# Patient Record
Sex: FEMALE | Race: WHITE | Employment: UNEMPLOYED | URBAN - METROPOLITAN AREA
[De-identification: names, ages, dates, MRNs, and addresses within clinical notes are randomized per-mention and may not be internally consistent; named-entity substitution may affect disease eponyms.]

---
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
## 2022-02-02 NOTE — PROGRESS NOTES
Behavioral Services      TEAM REVIEW    Date: 4/16/19    The unit team and provider met, reviewed patient's case, problem goals and objectives.    Current Diagnoses:  Alcohol Use Disorder   305.00 (F10.10) Mild    304.30 (F12.20) Cannabis Use Disorder Moderate       Bipolar affective disorder II -- (F31.81) vs  disruptive mood dysregulation disorder/DMDD -- (F34.8).   Cannabis use disorder, moderate (F12.20).   Alcohol use disorder, moderate (F10.20).   Obsessive-compulsive disorder (F42) - by history.     Tourette's disorder by history (F95.2).  Multiple fears versus phobias - of being alone or of being followed by somebody who is out to get her (F40.248), of spiders (F40.228.).   Parent-child relational strain (Z62.820).   Sibling relational strain (Z62.891).   Academic educational problem (Z55.9).       Safety concerns since last review (SI, SIB, HI)  Denied this week       Chemical use since last review:  Relapse on Marijuana   UA Results:    Recent Results (from the past 168 hour(s))   Ethyl Glucuronide Urine    Collection Time: 04/10/19  1:15 PM   Result Value Ref Range    Ethyl Glucuronide Urine Negative      Drug abuse screen 77 urine    Collection Time: 04/10/19  1:15 PM   Result Value Ref Range    Amphetamine Qual Urine Negative NEG^Negative    Barbiturates Qual Urine Negative NEG^Negative    Benzodiazepine Qual Urine Negative NEG^Negative    Cannabinoids Qual Urine Positive (A) NEG^Negative    Cocaine Qual Urine Negative NEG^Negative    Opiates Qualitative Urine Negative NEG^Negative    PCP Qual Urine Negative NEG^Negative   Creatinine random urine    Collection Time: 04/10/19  1:15 PM   Result Value Ref Range    Creatinine Urine Random 90 mg/dL   Ethyl Glucuronide Urine    Collection Time: 04/12/19  1:05 PM   Result Value Ref Range    Ethyl Glucuronide Urine Negative      Drug abuse screen 77 urine    Collection Time: 04/12/19  1:05 PM   Result Value Ref Range    Amphetamine Qual Urine Negative  NEG^Negative    Barbiturates Qual Urine Negative NEG^Negative    Benzodiazepine Qual Urine Negative NEG^Negative    Cannabinoids Qual Urine Positive (A) NEG^Negative    Cocaine Qual Urine Negative NEG^Negative    Opiates Qualitative Urine Negative NEG^Negative    PCP Qual Urine Negative NEG^Negative       Progress toward treatment goal:  Attending treatment and completed initial assignments         Other Therapy Interfering Behaviors:  Breaking stage expectations  Substance use   Not bought in to completing treatment   Stealing       Current medications/changes and medical concerns:  Current Outpatient Medications   Medication     acetylcysteine (N-ACETYL CYSTEINE) 600 MG CAPS capsule     cloNIDine (CATAPRES) 0.1 MG tablet     escitalopram (LEXAPRO) 5 MG tablet     fluticasone (FLONASE) 50 MCG/ACT spray     hydrOXYzine (ATARAX) 25 MG tablet     levonorgestrel (MIRENA) 20 MCG/24HR IUD     loratadine (CLARITIN) 10 MG tablet     lurasidone (LATUDA) 20 MG TABS tablet     metFORMIN (GLUCOPHAGE-XR) 500 MG 24 hr tablet     Current Facility-Administered Medications   Medication     diphenhydrAMINE (BENADRYL) capsule 25 mg     Facility-Administered Medications Ordered in Other Encounters   Medication     acetaminophen (TYLENOL) tablet 650 mg     benzocaine-menthol (CEPACOL) 15-3.6 MG lozenge 1 lozenge     calcium carbonate (TUMS) chewable tablet 1,000 mg     ibuprofen (ADVIL/MOTRIN) tablet 400 mg     Increase in NAC 1200  Hydroxyzine 25mg       Family Involvement -  Mother has been actively involved, however not holding client accountable to stages and buying client's friend's vape and client using that for substances.     Current assignments:  Timeline     Current Stage:  1    Tasks:  Speak with mother to discuss that she needs to decide if client will be continuing in this program or not.     Discharge Planning:  Target Discharge Date/Timeframe:  7-10 weeks    Med Mgmt Provider/Appt:  Dr. Ledbetter at [facility]  therapy Provider/Appt:  Brynn Wayne    Family therapy Provider/Appt:  in home with [place]    [facility] II plan[facility]    School enrollment:  will need referral    Other referrals:  [facility]         Attended by:  Monisha Clarke MD, Trina Ovalle MA, [facility], [facility], Shadia Snow, RN, Nataliia Reeves, [facility], Gil Herzog, [facility], [facility], [facility], Mari Ybarra, MATHEW, [facility], [facility], Omar Christine, [facility], [facility], Lacy Sotelo M.Div., Shanika Boone, [facility], [facility], Adiel Dent, Intern,          No

## 2022-04-28 NOTE — PROGRESS NOTES
4/18/2019 [facility] 2  Group Chart Note - Co-facilitated with Omar Christine MPS, [facility]    Number of clients attending the group:  6      Joselyn Ibarra attended 1 hour Health Education  group covering the following topic of The effects of marijuana on the brain and body. Client was Actively participating, Attentive and Engaged.  Client's response: Client was actively engaged and asked questions appropriate to the topic of discussion.   Detail Level: Detailed Add 12067 Cpt? (Important Note: In 2017 The Use Of 77074 Is Being Tracked By Cms To Determine Future Global Period Reimbursement For Global Periods): yes

## 2022-11-01 ENCOUNTER — TELEPHONE ENCOUNTER (OUTPATIENT)
Dept: URBAN - METROPOLITAN AREA CLINIC 82 | Facility: CLINIC | Age: 22
End: 2022-11-01

## 2023-05-16 NOTE — PROGRESS NOTES
[facility], [place]   Psychiatric Progress Note      Impression:   This is a 18 year old female admitted for SI and s/p suicide attempt.  We are adjusting medications to target mood, impulsivity, trauma symptoms and tics.  We are also working with the patient on therapeutic skill building.  She has not been active on our unit thus far, citing fatigue that may still be from her overdose, though may also be from getting back on her prior medications for the first time in 1.5 weeks.  She is ambivalent about using Haloperidol or Pimozide for her tics at this time.  We are seeking further collateral information from her outpatient team, as well as will need to revise our family and Rule 25 assessments to make formal recommendations for treatment from here.         Diagnoses and Plan:     Principal Diagnosis:   Principal Problem:    Unspecified trauma- and stressor-related disorder (10/9/2018)  Active Problems:    Unspecified disruptive, impulse-control, and conduct disorder (12/3/2012)    Bipolar II disorder (10/9/2018)    Tourette's disorder (10/9/2018)    Obsessive-compulsive disorder (10/9/2018)    Cannabis use disorder, severe (10/9/2018)    Alcohol use disorder, mild (10/9/2018)    Vitamin D insufficiency (10/10/2018)    Tobacco use disorder, moderate (10/12/2018)    Low vitamin B12 level (6/25/2019)    Unit: 6AE  Attending: Marquez  Medications: risks/benefits discussed with guardian/patient  - Continue Escitalopram 5mg PO daily  - Continue Clonidine 0.2mg PO at bedtime  - Again stop Lurasidone and Metformin; did not discontinue orders, though patient did decline them today  - Offered Haloperidol vs. Pimozide to treat tics and mood disorder, though she was ambivalent with concerns for side effects              - If she does want to trial this, would need to repeat EKG to make sure that QTc has normalized  - Consider NRT if requested  Laboratory/Imaging:  - CBC wnl  - LFT's wnl except low  albumin  - TSH wnl  - Fasting glucose wnl  - Fasting lipids wnl  - Ferritin wnl  - Vitamin B12 low (<500), Folate wnl  - Vitamin D low (<30)  Consults:  - Rule 25 assessment revision pending  Patient will be treated in therapeutic milieu with appropriate individual and group therapies as described.  Family Assessment pending    Medical diagnoses to be addressed this admission:   s/p overdose --> lingering fatigue may be from this  - Continue to monitor  - Repeat EKG if starting meds     Obesity/Weight gain  - Will need to consider medications that pose less risk for weight gain     Vitamin D insufficiency  - Look to restart Vitamin D3 2000 units PO daily    Vitamin B12 insufficiency  - Look to start Vitamin B12 500mcg PO daily    Relevant psychosocial stressors: family dynamics, school, medical issues and trauma    Legal Status: Voluntary    Safety Assessment:   Checks: Status 15  Precautions:  Suicide  Self-harm  Assault  Pt has not required locked seclusion or restraints in the past 24 hours to maintain safety, please refer to RN documentation for further details.    The risks, benefits, alternatives and side effects have been discussed and are understood by the patient and other caregivers.     Anticipated Disposition/Discharge Date: 7/1-3  Target symptoms to stabilize: SI, irritable, depressed, neurovegetative symptoms, sleep issues, poor frustration tolerance, substance use, impulsive and hyperarousal  Target disposition: need more information, but could range from return to current outpatient services with increased elements, Dual IOP, or Dual RTC    Attestation:  Patient has been seen and evaluated by me,  Blaze Marquez MD          Interim History:   The patient's care was discussed with the treatment team and chart notes were reviewed.    Side effects to medication: sedation  Sleep: slept through the night  Intake: eating/drinking without difficulty  Groups: refusing groups  Peer interactions: isolative and  "withdrawn    I found Ann Marie asleep in bed prior to lunch. Ann Marie reported feeling \"tired\" still, though not sure why. She has not been going to groups because of her fatigue. She did sleep through the night, though. She denied any other lingering effects from her overdose. She has been eating. She denied having any SI now. She has talked with her mother during a visit yesterday, with her still being mad at her mother for putting her in the hospital. She did look over the information regarding Haloperidol or Pimozide for her tics, with her noting that using them \"seems risky.\" She did not have any decision about trialing those at this time.     The 10 point Review of Systems is negative other than noted in the HPI    Attempted to reach mother by phone, but unable to connect.         Medications:       cloNIDine  0.2 mg Oral Daily     escitalopram  5 mg Oral QPM     lurasidone  20 mg Oral Daily     metFORMIN  1,000 mg Oral Daily with supper             Allergies:   No Known Allergies         Psychiatric Examination:   /56   Pulse 85   Temp 97.6  F (36.4  C) (Oral)   Resp 13   Ht 1.651 m (5' 5\")   Wt 102.5 kg (226 lb)   LMP  (LMP Unknown)   SpO2 97%   BMI 37.61 kg/m    Weight is 226 lbs 0 oz  Body mass index is 37.61 kg/m .    Appearance:  dressed in hospital scrubs, fatigued, obese and unkempt  Attitude:  somewhat cooperative  Eye Contact:  poor   Mood:  \"tired\"  Affect:  mood congruent, intensity is blunted, constricted mobility and restricted range  Speech:  clear, coherent, decreased prosody and mumbling  Psychomotor Behavior:  no evidence of tardive dyskinesia, dystonia, or tics and physical retardation  Thought Process:  linear  Associations:  no loose associations  Thought Content:  no evidence of psychotic thought and denied current SI  Insight:  partial  Judgment:  limited to poor  Oriented to:  time, person, and place  Attention Span and Concentration:  limited to poor  Recent and Remote Memory:  " limited  Language: intact  Fund of Knowledge: appropriate  Muscle Strength and Tone: normal  Gait and Station: not observed         Labs:     Recent Results (from the past 24 hour(s))   CBC with platelets differential    Collection Time: 06/25/19  7:39 AM   Result Value Ref Range    WBC 5.9 4.0 - 11.0 10e9/L    RBC Count 4.33 3.7 - 5.3 10e12/L    Hemoglobin 11.9 11.7 - 15.7 g/dL    Hematocrit 37.7 35.0 - 47.0 %    MCV 87 77 - 100 fl    MCH 27.5 26.5 - 33.0 pg    MCHC 31.6 31.5 - 36.5 g/dL    RDW 14.7 10.0 - 15.0 %    Platelet Count 359 150 - 450 10e9/L    Diff Method Automated Method     % Neutrophils 45.7 %    % Lymphocytes 40.6 %    % Monocytes 9.8 %    % Eosinophils 2.9 %    % Basophils 0.8 %    % Immature Granulocytes 0.2 %    Nucleated RBCs 0 0 /100    Absolute Neutrophil 2.7 1.3 - 7.0 10e9/L    Absolute Lymphocytes 2.4 1.0 - 5.8 10e9/L    Absolute Monocytes 0.6 0.0 - 1.3 10e9/L    Absolute Eosinophils 0.2 0.0 - 0.7 10e9/L    Absolute Basophils 0.1 0.0 - 0.2 10e9/L    Abs Immature Granulocytes 0.0 0 - 0.4 10e9/L    Absolute Nucleated RBC 0.0    Lipid panel reflex to direct LDL    Collection Time: 06/25/19  7:39 AM   Result Value Ref Range    Cholesterol 139 <170 mg/dL    Triglycerides 66 <90 mg/dL    HDL Cholesterol 43 (L) >45 mg/dL    LDL Cholesterol Calculated 83 <110 mg/dL    Non HDL Cholesterol 96 <120 mg/dL   Hepatic panel    Collection Time: 06/25/19  7:39 AM   Result Value Ref Range    Bilirubin Direct <0.1 0.0 - 0.2 mg/dL    Bilirubin Total 0.3 0.2 - 1.3 mg/dL    Albumin 3.0 (L) 3.4 - 5.0 g/dL    Protein Total 7.5 6.8 - 8.8 g/dL    Alkaline Phosphatase 61 40 - 150 U/L    ALT 13 0 - 50 U/L    AST 10 0 - 35 U/L   Glucose    Collection Time: 06/25/19  7:39 AM   Result Value Ref Range    Glucose 77 70 - 99 mg/dL        [FreeTextEntry1] : Patient was seen and evaluated. Continue to closely monitor the wound- healed at this time. Patient will benefit from custom foot orthotics for arch support and to stabilize and assist with gait and to offload the right hallux.   Patient to transition into regular shoes, Discussed with patient to wear supportive shoe gear with wide toed shoes and extra depth in toe box to accommodate the deformity.\par Discussed with patient to perform daily foot checks and monitor for any new lesions, open wounds or calluses and to return to clinic at the earliest.\par Discussed with patient and mother that will order MRI at the next visit to re-asses the OM in the hallux.  \par Spent 20 minutes for patient care and medical decision making.\par

## 2023-06-15 NOTE — PROGRESS NOTES
Patient states that she has high self esteem but it is very difficult for her to believe that others would think of her positively. Denies SI/HI/SIB/AVH. Goal for this hospitalization is to stabilize then discharge quickly.       01/02/20 1300   Behavioral Health   Hallucinations denies / not responding to hallucinations   Thinking intact   Orientation person: oriented;place: oriented;date: oriented;time: oriented   Memory baseline memory   Insight admits / accepts   Judgement intact   Eye Contact at examiner   Affect blunted, flat   Mood depressed   Physical Appearance/Attire appears stated age   Hygiene well groomed   Suicidality other (see comments)  (denies)   1. Wish to be Dead (Recent) No   2. Non-Specific Active Suicidal Thoughts (Recent) No   Self Injury other (see comment)  (denies)   Elopement   (no concern)   Activity isolative   Speech clear;coherent   Medication Sensitivity no stated side effects;no observed side effects   Psychomotor / Gait balanced;steady   Activities of Daily Living   Hygiene/Grooming independent   Oral Hygiene independent   Dress scrubs (behavioral health)   Room Organization independent   Yovani Diaz on 1/2/2020 at 1:55 PM     [facility]

## 2023-11-26 NOTE — PROGRESS NOTES
"Interdisciplinary Assessment  Music Therapy     Occupational Therapy     Recreation Therapy    Summary:While in Therapeutic Recreation structured groups, interventions to focus on promoting development of strategies that help patient to regulate impulse control, learn appropriate and satisfying methods of dealing with stressors and feelings.  Patient will demonstrate effective coping skills in dealing with problems, will develop strategies to control negative impulses/acting out behaviors, increase ability to express anger in appropriate and non-violent ways.  Will provide and help patient to explore satisfying alternatives to aggressive behavior (e.g. Physical outlets for redirection of angry feelings, hobbies or other individual leisure pursuits).    Date initially attended:  October 10, 2018  Patient Interview:    1. What do you enjoy doing? \"cooking, listening to music, making lists, hanging out with friends\"    Observations;  Group Interactions: Interacts appropriately with staff or Interacts appropriately with peers  Frustration Tolerance: Independently identifies source of frustration / stress or Independently identifies and applies coping skills  Affect:Appropriate to situation  Concentration: 30 + minutes  calm, focused or attentive  Boundaries: Maintains appropriate physical boundaries or Maintains appropriate verbal boundaries  Activity Adaptations:   Not needed for group  Initial Therapeutic Interventions:  Suicide prevention .  Initial Therapeutic Approaches:   therapeutic activities  Recommendations:  While in Therapeutic Recreation groups, interventions to focus on improvement in ability to manage stressors which threaten sobriety. Patient will learn skills to manage stressors, anxiety, and boredom, and to utilize their recreation as a positive alternative to continued substance use.        " Group Therapy Note    Date: 11/26/2023    Group Start Time: 1030  Group End Time: 1294  Group Topic: Psychoeducation    [address] [place], MSW        Group Therapy Note    Attendees: 8    Facilitator led a psychoeducation group focused on SMART goals. The group started with a introduction and an ice breaker, two truths and one lie. Patients then explored the purpose and benefits of goal setting and were introduced to the techniques of SMART goals. Patients discussed the steps of setting a SMART goal and then practiced writing their own. Each participant was given an opportunity to share their goal. The group concluded with an informational article on SMART goals and were encouraged to practice using the SMART goal method during the morning community meetings where they are asked to set daily goals. Notes:  Patient was engaged in group discussion and shared thoughts and insights on setting SMART goals. Status After Intervention:  Improved    Participation Level:  Active Listener and Interactive    Participation Quality: Appropriate, Attentive, and Sharing      Speech:  normal      Thought Process/Content: Logical  Linear      Affective Functioning: Congruent      Mood: euthymic      Level of consciousness:  Alert, Oriented x4, and Attentive      Response to Learning: Able to verbalize current knowledge/experience, Able to verbalize/acknowledge new learning, Capable of insight, Able to change behavior, and Progressing to goal      Endings: None Reported    Modes of Intervention: Education      Discipline Responsible: /Counselor      Signature:  FREEMAN Vines

## 2024-05-08 NOTE — PROGRESS NOTES
"Pt and  2 other female peers staged a \"sit-in\" refusing all redirection.  Security called to complete a walk through  " Oral Maxillofacial Surgery    OR unable to accommodate patient today.  OK to resume diet (will secure chat nursing)  Team will come by later today and evaluate patient for bedside extraction under local anesthesia with IV anxiolysis.